# Patient Record
Sex: FEMALE | Race: WHITE | NOT HISPANIC OR LATINO | URBAN - METROPOLITAN AREA
[De-identification: names, ages, dates, MRNs, and addresses within clinical notes are randomized per-mention and may not be internally consistent; named-entity substitution may affect disease eponyms.]

---

## 2021-04-23 ENCOUNTER — EMERGENCY (EMERGENCY)
Facility: HOSPITAL | Age: 26
LOS: 1 days | Discharge: HOME | End: 2021-04-23
Admitting: EMERGENCY MEDICINE
Payer: COMMERCIAL

## 2021-04-23 VITALS
SYSTOLIC BLOOD PRESSURE: 104 MMHG | RESPIRATION RATE: 16 BRPM | HEART RATE: 60 BPM | OXYGEN SATURATION: 100 % | TEMPERATURE: 99 F | DIASTOLIC BLOOD PRESSURE: 70 MMHG

## 2021-04-23 DIAGNOSIS — Z20.822 CONTACT WITH AND (SUSPECTED) EXPOSURE TO COVID-19: ICD-10-CM

## 2021-04-23 LAB — SARS-COV-2 RNA SPEC QL NAA+PROBE: SIGNIFICANT CHANGE UP

## 2021-04-23 PROCEDURE — U0003: CPT

## 2021-04-23 PROCEDURE — 99283 EMERGENCY DEPT VISIT LOW MDM: CPT

## 2021-04-23 PROCEDURE — U0005: CPT

## 2021-04-23 PROCEDURE — 99282 EMERGENCY DEPT VISIT SF MDM: CPT

## 2021-04-23 NOTE — ED PROVIDER NOTE - PATIENT PORTAL LINK FT
You can access the FollowMyHealth Patient Portal offered by [facility] by registering at the following website: [email or web address]. By joining [facility]’s FollowMyHealth portal, you will also be able to view your health information using other applications (apps) compatible with our system.

## 2023-05-17 ENCOUNTER — TRANSCRIPTION ENCOUNTER (OUTPATIENT)
Age: 28
End: 2023-05-17

## 2023-05-17 ENCOUNTER — NON-APPOINTMENT (OUTPATIENT)
Age: 28
End: 2023-05-17

## 2023-05-17 ENCOUNTER — APPOINTMENT (OUTPATIENT)
Dept: ORTHOPEDIC SURGERY | Facility: CLINIC | Age: 28
End: 2023-05-17
Payer: COMMERCIAL

## 2023-05-17 VITALS — BODY MASS INDEX: 22.76 KG/M2 | WEIGHT: 145 LBS | HEIGHT: 67 IN

## 2023-05-17 DIAGNOSIS — M54.16 RADICULOPATHY, LUMBAR REGION: ICD-10-CM

## 2023-05-17 DIAGNOSIS — M54.50 LOW BACK PAIN, UNSPECIFIED: ICD-10-CM

## 2023-05-17 DIAGNOSIS — Z78.9 OTHER SPECIFIED HEALTH STATUS: ICD-10-CM

## 2023-05-17 PROBLEM — Z00.00 ENCOUNTER FOR PREVENTIVE HEALTH EXAMINATION: Status: ACTIVE | Noted: 2023-05-17

## 2023-05-17 PROCEDURE — 72110 X-RAY EXAM L-2 SPINE 4/>VWS: CPT

## 2023-05-17 PROCEDURE — 99204 OFFICE O/P NEW MOD 45 MIN: CPT

## 2023-05-17 NOTE — HISTORY OF PRESENT ILLNESS
[de-identified] : 28 year old female presents with low back pain.  She denies radicular pain, recent illness, fevers, numbness, weakness, balance problems, saddle anesthesia, urinary retention or fecal incontinence.  The pain is worse with activity.  She is taking cyclobenzaprine and naproxen.   She denies trauma.

## 2023-05-17 NOTE — ASSESSMENT
[FreeTextEntry1] : 28 year old female presents with low back pain.  She denies radicular pain. She is neurologically intact.  The patient was given a referral for physical therapy.  She was given diclofenac.  She will followup in 4 weeks. If she fails to improve will consider MRI. We discussed red flag symptoms that would require emergent evaluation. She knows to call with any questions or concerns or if her symptoms acutely worsen.

## 2023-05-17 NOTE — PHYSICAL EXAM
[de-identified] : General: No acute distress, conversant, well-nourished.\par Head: Normocephalic, atraumatic\par Neck: trachea midline, FROM\par Heart: normotensive and normal rate and rhythm\par Lungs: No labored breathing\par Skin: No abrasions, no rashes, no edema\par Psych: Alert and oriented to person, place and time\par Extremities: no peripheral edema or digital cyanosis\par Gait: Normal gait. Can perform tandem gait.  \par Vascular: warm and well perfused distally, palpable distal pulses\par \par MSK:\par Lumbar spine:\par No tenderness to palpation.  No step-off, no deformity.\par \par NEURO EXAM:\par Sensation \par Left L2  -  2/2            \par Left L3  -  2/2\par Left L4  -  2/2\par Left L5  -  2/2\par Left S1  -  2/2\par \par Right L2  -  2/2            \par Right L3  -  2/2\par Right L4  -  2/2\par Right L5  -  2/2\par Right S1  -  2/2\par \par Motor: \par Left L2 (hip flexion)                            5/5                \par Left L3 (knee extension)                   5/5                \par Left L4 (ankle dorsiflexion)                 5/5                \par Left L5 (long toe extensor)                5/5                \par Left S1 (ankle plantar flexion)           5/5\par \par Right L2 (hip flexion)                            5/5                \par Right L3 (knee extension)                   5/5                \par Right L4 (ankle dorsiflexion)                 5/5                \par Right L5 (long toe extensor)                5/5                \par Right S1 (ankle plantar flexion)           5/5\par \par Reflexes: Normal and symmetric\par Negative clonus.  Down-going Babinski.   [de-identified] : I ordered radiographs to evaluate the patient's symptoms.\par \par Lumbar 4 view radiographs taken in the office today show no dislocation or fracture.  No instability on dynamic series.

## 2023-06-14 ENCOUNTER — APPOINTMENT (OUTPATIENT)
Dept: ORTHOPEDIC SURGERY | Facility: CLINIC | Age: 28
End: 2023-06-14

## 2023-06-15 ENCOUNTER — RX RENEWAL (OUTPATIENT)
Age: 28
End: 2023-06-15

## 2023-06-15 RX ORDER — DICLOFENAC SODIUM 75 MG/1
75 TABLET, DELAYED RELEASE ORAL
Qty: 60 | Refills: 0 | Status: ACTIVE | COMMUNITY
Start: 2023-05-17 | End: 1900-01-01